# Patient Record
Sex: MALE | Race: WHITE | NOT HISPANIC OR LATINO | Employment: FULL TIME | URBAN - METROPOLITAN AREA
[De-identification: names, ages, dates, MRNs, and addresses within clinical notes are randomized per-mention and may not be internally consistent; named-entity substitution may affect disease eponyms.]

---

## 2021-01-21 ENCOUNTER — APPOINTMENT (EMERGENCY)
Dept: RADIOLOGY | Facility: HOSPITAL | Age: 61
End: 2021-01-21
Payer: OTHER MISCELLANEOUS

## 2021-01-21 ENCOUNTER — HOSPITAL ENCOUNTER (EMERGENCY)
Facility: HOSPITAL | Age: 61
Discharge: HOME | End: 2021-01-21
Attending: EMERGENCY MEDICINE | Admitting: EMERGENCY MEDICINE
Payer: OTHER MISCELLANEOUS

## 2021-01-21 VITALS
SYSTOLIC BLOOD PRESSURE: 141 MMHG | TEMPERATURE: 98 F | DIASTOLIC BLOOD PRESSURE: 74 MMHG | HEART RATE: 57 BPM | WEIGHT: 195 LBS | OXYGEN SATURATION: 99 % | RESPIRATION RATE: 18 BRPM | BODY MASS INDEX: 27.2 KG/M2

## 2021-01-21 DIAGNOSIS — S50.10XA FOREARM CONTUSION: ICD-10-CM

## 2021-01-21 DIAGNOSIS — S39.012A LUMBAR STRAIN: Primary | ICD-10-CM
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
## 2021-01-21 PROCEDURE — 99284 EMERGENCY DEPT VISIT MOD MDM: CPT | Performed by: EMERGENCY MEDICINE

## 2021-01-21 PROCEDURE — 72100 X-RAY EXAM L-S SPINE 2/3 VWS: CPT

## 2021-01-21 PROCEDURE — 99283 EMERGENCY DEPT VISIT LOW MDM: CPT

## 2021-01-21 PROCEDURE — 73090 X-RAY EXAM OF FOREARM: CPT

## 2021-01-21 RX ORDER — CYCLOBENZAPRINE HCL 10 MG
10 TABLET ORAL 2 TIMES DAILY PRN
Qty: 20 TABLET | Refills: 0 | Status: SHIPPED | OUTPATIENT
Start: 2021-01-21 | End: 2021-01-31

## 2021-01-21 RX ORDER — NAPROXEN 500 MG/1
500 TABLET ORAL 2 TIMES DAILY PRN
Qty: 15 TABLET | Refills: 0 | Status: SHIPPED | OUTPATIENT
Start: 2021-01-21

## 2021-01-22 ENCOUNTER — APPOINTMENT (OUTPATIENT)
Dept: URGENT CARE | Facility: CLINIC | Age: 61
End: 2021-01-22

## 2021-01-22 NOTE — ED PROVIDER NOTES
History  Chief Complaint   Patient presents with   Jose Chavis     pt reports falling off of step ladder this afternoon while at work  reports falling onto left side, denies hitting his head  c/o left arm and back pain  daily asa       History provided by:  Patient   used: No    Fall  Mechanism of injury: fall    Injury location:  Torso and shoulder/arm  Shoulder/arm injury location:  L forearm  Torso injury location:  Back  Incident location:  Work  Arrived directly from scene: no    Fall:     Fall occurred:  From a ladder (from the third step of a three step step-ladder)    Impact surface:  [place]    Point of impact:  Back and outstretched arms    Entrapped after fall: no    Suspicion of alcohol use: no    Suspicion of drug use: no    Prior to arrival data:     Bystander interventions:  None    Patient ambulatory at scene: yes      Blood loss:  None    Responsiveness at scene:  Alert    Loss of consciousness: no      Amnesic to event: no    Associated symptoms: back pain    Associated symptoms: no chest pain, no headaches, no loss of consciousness, no nausea, no neck pain and no vomiting        Prior to Admission Medications   Prescriptions Last Dose Informant Patient Reported? Taking? amLODIPine (NORVASC) 5 mg tablet   Yes No   Sig: Take 5 mg by mouth daily  aspirin 81 MG tablet   Yes No   Sig: Take 81 mg by mouth daily  bisacodyl (DULCOLAX) 5 mg EC tablet   No No   Sig: Take 10 mg on day 3 of Miralax  Repeat in 6 hrs    metoprolol succinate (TOPROL-XL) 50 mg 24 hr tablet   Yes No   Sig: Take 50 mg by mouth daily  nabumetone (RELAFEN) 500 mg tablet   Yes No   Sig: Take 500 mg by mouth daily  polyethylene glycol (MIRALAX) 17 g packet   No No   Si packet 2 times a day for 3 days then one packet daily for the next 4 days  simvastatin (ZOCOR) 20 mg tablet   Yes No   Sig: Take 20 mg by mouth daily at bedtime        Facility-Administered Medications: None       Past Medical History: Diagnosis Date    Cyst of kidney, acquired     Hyperlipidemia     Hypertension     Rheumatoid arthritis ([address] )        Past Surgical History:   Procedure Laterality Date    ELBOW ARTHROPLASTY      KNEE ARTHROSCOPY         Family History   Problem Relation Age of Onset    Hypertension Mother     Diabetes Mother     Hypertension Father     Diabetes Brother      I have reviewed and agree with the history as documented  E-Cigarette/Vaping     E-Cigarette/Vaping Substances     Social History     Tobacco Use    Smoking status: Never Smoker   Substance Use Topics    Alcohol use: Yes     Alcohol/week: 3 0 standard drinks     Types: 3 Cans of beer per week     Comment: every two weeks    Drug use: No       Review of Systems   Constitutional: Negative for chills, diaphoresis and fever  Respiratory: Negative for shortness of breath  Cardiovascular: Negative for chest pain and palpitations  Gastrointestinal: Negative for diarrhea, nausea and vomiting  Genitourinary: Negative for dysuria and frequency  Musculoskeletal: Positive for arthralgias (left forearm pain) and back pain  Negative for neck pain  Skin: Negative for rash  Neurological: Negative for loss of consciousness and headaches  All other systems reviewed and are negative  Physical Exam  Physical Exam  Vitals signs and nursing note reviewed  Constitutional:       General: He is not in acute distress  Appearance: He is well-developed  HENT:      Head: Normocephalic and atraumatic  Eyes:      Pupils: Pupils are equal, round, and reactive to light  Neck:      Musculoskeletal: Normal range of motion  Vascular: No JVD  Cardiovascular:      Rate and Rhythm: Normal rate and regular rhythm  Heart sounds: Normal heart sounds  No murmur  No friction rub  No gallop  Pulmonary:      Effort: Pulmonary effort is normal  No respiratory distress  Breath sounds: Normal breath sounds  No wheezing or rales     Chest: Chest wall: No tenderness  Musculoskeletal: Normal range of motion  General: No tenderness (no midline back tendereness)  Skin:     General: Skin is warm and dry  Comments: Contusion to left forearm near elbow       Neurological:      General: No focal deficit present  Mental Status: He is alert and oriented to person, place, and time  Psychiatric:         Behavior: Behavior normal          Thought Content: Thought content normal          Judgment: Judgment normal          Vital Signs  ED Triage Vitals   Temperature Pulse Respirations Blood Pressure SpO2   01/21/21 1854 01/21/21 1857 01/21/21 1854 01/21/21 1857 01/21/21 1857   98 °F (36 7 °C) 56 18 162/74 98 %      Temp Source Heart Rate Source Patient Position - Orthostatic VS BP Location FiO2 (%)   01/21/21 1854 01/21/21 1906 01/21/21 1906 01/21/21 1906 --   Oral Monitor Sitting Right arm       Pain Score       01/21/21 1906       8           Vitals:    01/21/21 1857 01/21/21 1906   BP: 162/74 141/74   Pulse: 56 57   Patient Position - Orthostatic VS:  Sitting         Visual Acuity      ED Medications  Medications - No data to display    Diagnostic Studies  Results Reviewed     None                 XR forearm 2 views LEFT   ED Interpretation by Nichole Plummer MD (01/21 2000)   This film was interpreted independently by me  No fracture or dislocation  XR lumbar spine 2 or 3 views   ED Interpretation by Nichole Plummer MD (01/21 2000)   This film was interpreted independently by me  No fracture identified  Procedures  Procedures         ED Course                             SBIRT 22yo+      Most Recent Value   SBIRT (24 yo +)   In order to provide better care to our patients, we are screening all of our patients for alcohol and drug use  Would it be okay to ask you these screening questions? Yes Filed at: 01/21/2021 2013   Initial Alcohol Screen: US AUDIT-C    1  How often do you have a drink containing alcohol? 2 Filed at: 01/21/2021 2013   2  How many drinks containing alcohol do you have on a typical day you are drinking? 1 Filed at: 01/21/2021 2013   3a  Male UNDER 65: How often do you have five or more drinks on one occasion? 0 Filed at: 01/21/2021 2013   3b  FEMALE Any Age, or MALE 65+: How often do you have 4 or more drinks on one occassion? 0 Filed at: 01/21/2021 2013   Audit-C Score  3 Filed at: 01/21/2021 2013   MAHNAZ: How many times in the past year have you    Used an illegal drug or used a prescription medication for non-medical reasons? Never Filed at: 01/21/2021 2013                    [facility]  Number of Diagnoses or Management Options  Fall: new and requires workup  Forearm contusion: new and requires workup  Lumbar strain: new and requires workup  Diagnosis management comments: Background: 63 y o  male with low back and left forearm pain after injury    Differential DX includes but is not limited to: fracture vs contusion vs sprain     Plan: imaging, symptom control         Amount and/or Complexity of Data Reviewed  Tests in the radiology section of CPT®: ordered and reviewed  Independent visualization of images, tracings, or specimens: yes    Risk of Complications, Morbidity, and/or Mortality  Diagnostic procedures: high    Patient Progress  Patient progress: stable      Disposition  Final diagnoses:   Lumbar strain   Forearm contusion   Fall     Time reflects when diagnosis was documented in both MDM as applicable and the Disposition within this note     Time User Action Codes Description Comment    1/21/2021  8:01 PM Arlene Dickens Add [S39 012A] Lumbar strain     1/21/2021  8:01 PM Arlene Dickens Add [S50 10XA] Forearm contusion     1/21/2021  8:01 PM Arlene Dickens Add [Z32  XXXA] Fall       ED Disposition     ED Disposition Condition Date/Time Comment    Discharge Stable u Jan 21, 2021  8:01 PM Maddy Saunders discharge to home/self care              Follow-up Information     Follow up With Specialties Details Why Stiven Avery 52, DO Family Medicine Schedule an appointment as soon as possible for a visit in 1 week  [address] [address] [phone]            Patient's Medications   Discharge Prescriptions    CYCLOBENZAPRINE (FLEXERIL) 10 MG TABLET    Take 1 tablet (10 mg total) by mouth 2 (two) times a day as needed for muscle spasms for up to 10 days       Start Date: 1/21/2021 End Date: 1/31/2021       Order Dose: 10 mg       Quantity: 20 tablet    Refills: 0    NAPROXEN (NAPROSYN) 500 MG TABLET    Take 1 tablet (500 mg total) by mouth 2 (two) times a day as needed for mild pain for up to 15 doses       Start Date: 1/21/2021 End Date: --       Order Dose: 500 mg       Quantity: 15 tablet    Refills: 0     No discharge procedures on file      PDMP Review     None          ED Provider  Electronically Signed by           Rosendo Lefort, MD  01/21/21 2013

## 2021-04-28 ENCOUNTER — IMMUNIZATIONS (OUTPATIENT)
Dept: FAMILY MEDICINE CLINIC | Facility: HOSPITAL | Age: 61
End: 2021-04-28

## 2021-04-28 DIAGNOSIS — Z23 ENCOUNTER FOR IMMUNIZATION: Primary | ICD-10-CM

## 2021-04-28 PROCEDURE — 91300 SARS-COV-2 / COVID-19 MRNA VACCINE (PFIZER-BIONTECH) 30 MCG: CPT

## 2021-04-28 PROCEDURE — 0001A SARS-COV-2 / COVID-19 MRNA VACCINE (PFIZER-BIONTECH) 30 MCG: CPT

## 2021-05-20 ENCOUNTER — IMMUNIZATIONS (OUTPATIENT)
Dept: FAMILY MEDICINE CLINIC | Facility: HOSPITAL | Age: 61
End: 2021-05-20

## 2021-05-20 DIAGNOSIS — Z23 ENCOUNTER FOR IMMUNIZATION: Primary | ICD-10-CM

## 2021-05-20 PROCEDURE — 91300 SARS-COV-2 / COVID-19 MRNA VACCINE (PFIZER-BIONTECH) 30 MCG: CPT

## 2021-05-20 PROCEDURE — 0002A SARS-COV-2 / COVID-19 MRNA VACCINE (PFIZER-BIONTECH) 30 MCG: CPT

## 2025-08-13 ENCOUNTER — APPOINTMENT (OUTPATIENT)
Dept: RADIOLOGY | Facility: MEDICAL CENTER | Age: 65
End: 2025-08-13
Payer: COMMERCIAL

## 2025-08-13 DIAGNOSIS — M54.2 CERVICALGIA: ICD-10-CM

## 2025-08-13 PROCEDURE — 72040 X-RAY EXAM NECK SPINE 2-3 VW: CPT
